# Patient Record
Sex: FEMALE | Race: WHITE | NOT HISPANIC OR LATINO | Employment: PART TIME | URBAN - METROPOLITAN AREA
[De-identification: names, ages, dates, MRNs, and addresses within clinical notes are randomized per-mention and may not be internally consistent; named-entity substitution may affect disease eponyms.]

---

## 2020-04-08 ENCOUNTER — TELEPHONE (OUTPATIENT)
Dept: PULMONOLOGY | Facility: CLINIC | Age: 65
End: 2020-04-08

## 2020-04-14 ENCOUNTER — TELEPHONE (OUTPATIENT)
Dept: PULMONOLOGY | Facility: CLINIC | Age: 65
End: 2020-04-14

## 2020-04-14 NOTE — TELEPHONE ENCOUNTER
----- Message from Aisha Maharaj sent at 4/14/2020  3:28 PM CDT -----  Contact: pt  Would like to consult with nurse regarding her appt being moved to July, wants to know is doctor doing virtual visits. Please give a call back at [phone].          Thanks,  Aisha REVELES

## 2020-04-21 DIAGNOSIS — Z01.84 ANTIBODY RESPONSE EXAMINATION: ICD-10-CM

## 2020-05-21 DIAGNOSIS — Z01.84 ANTIBODY RESPONSE EXAMINATION: ICD-10-CM

## 2020-06-12 ENCOUNTER — LAB VISIT (OUTPATIENT)
Dept: LAB | Facility: HOSPITAL | Age: 65
End: 2020-06-12
Attending: INTERNAL MEDICINE
Payer: MEDICARE

## 2020-06-12 DIAGNOSIS — Z01.84 ANTIBODY RESPONSE EXAMINATION: ICD-10-CM

## 2020-06-12 LAB — SARS-COV-2 IGG SERPLBLD QL IA.RAPID: NEGATIVE

## 2020-06-12 PROCEDURE — 36415 COLL VENOUS BLD VENIPUNCTURE: CPT

## 2020-06-12 PROCEDURE — 86769 SARS-COV-2 COVID-19 ANTIBODY: CPT

## 2020-07-20 ENCOUNTER — TELEPHONE (OUTPATIENT)
Dept: PULMONOLOGY | Facility: CLINIC | Age: 65
End: 2020-07-20

## 2020-07-20 NOTE — TELEPHONE ENCOUNTER
----- Message from Pablito Rebolledo sent at 7/20/2020  9:36 AM CDT -----  Regarding: Appointment  Contact: Pt  Pt called in regards to seeing if  has any later times on 07/21/20 or 07/22/20. Pt can be reached at [phone].

## 2020-07-21 ENCOUNTER — OFFICE VISIT (OUTPATIENT)
Dept: PULMONOLOGY | Facility: CLINIC | Age: 65
End: 2020-07-21
Payer: MEDICARE

## 2020-07-21 VITALS
BODY MASS INDEX: 19.38 KG/M2 | HEART RATE: 68 BPM | HEIGHT: 65 IN | DIASTOLIC BLOOD PRESSURE: 76 MMHG | RESPIRATION RATE: 18 BRPM | OXYGEN SATURATION: 97 % | WEIGHT: 116.31 LBS | SYSTOLIC BLOOD PRESSURE: 112 MMHG

## 2020-07-21 DIAGNOSIS — F51.04 PSYCHOPHYSIOLOGICAL INSOMNIA: ICD-10-CM

## 2020-07-21 DIAGNOSIS — G47.33 OSA (OBSTRUCTIVE SLEEP APNEA): Primary | ICD-10-CM

## 2020-07-21 PROBLEM — I44.2 THIRD DEGREE ATRIOVENTRICULAR BLOCK: Status: ACTIVE | Noted: 2020-02-26

## 2020-07-21 PROBLEM — I44.1 SECOND DEGREE AV BLOCK: Status: ACTIVE | Noted: 2020-02-26

## 2020-07-21 PROBLEM — R06.02 SHORTNESS OF BREATH: Status: ACTIVE | Noted: 2020-02-26

## 2020-07-21 PROBLEM — Z95.0 CARDIAC PACEMAKER IN SITU: Status: ACTIVE | Noted: 2020-03-04

## 2020-07-21 PROCEDURE — 99204 PR OFFICE/OUTPT VISIT, NEW, LEVL IV, 45-59 MIN: ICD-10-PCS | Mod: S$PBB,,, | Performed by: INTERNAL MEDICINE

## 2020-07-21 PROCEDURE — 99999 PR PBB SHADOW E&M-EST. PATIENT-LVL III: CPT | Mod: PBBFAC,,, | Performed by: INTERNAL MEDICINE

## 2020-07-21 PROCEDURE — 99204 OFFICE O/P NEW MOD 45 MIN: CPT | Mod: S$PBB,,, | Performed by: INTERNAL MEDICINE

## 2020-07-21 PROCEDURE — 99213 OFFICE O/P EST LOW 20 MIN: CPT | Mod: PBBFAC | Performed by: INTERNAL MEDICINE

## 2020-07-21 PROCEDURE — 99999 PR PBB SHADOW E&M-EST. PATIENT-LVL III: ICD-10-PCS | Mod: PBBFAC,,, | Performed by: INTERNAL MEDICINE

## 2020-07-21 RX ORDER — IBUPROFEN 200 MG
200 TABLET ORAL EVERY 6 HOURS PRN
COMMUNITY

## 2020-07-21 RX ORDER — DIAZEPAM 2 MG/1
TABLET ORAL
COMMUNITY
Start: 2020-06-02 | End: 2020-12-15

## 2020-07-21 RX ORDER — HYDROXYZINE PAMOATE 25 MG/1
25 CAPSULE ORAL NIGHTLY PRN
Qty: 30 CAPSULE | Refills: 11 | Status: SHIPPED | OUTPATIENT
Start: 2020-07-21 | End: 2020-12-15

## 2020-07-21 NOTE — PATIENT INSTRUCTIONS
[facility] to schedule sleep study at  [phone] . Usually take 1- 2 days to get insurance company approval.  You will need to schedule at follow up clinic appointment 7 days after the sleep study to review the results.

## 2020-07-21 NOTE — PROGRESS NOTES
Subjective:     Patient ID: Viola Collins is a 64 y.o. female.    Chief Complaint:      HPI initial office visit for this 64-year-old female patient with insomnia and suspected obstructive sleep apnea.    Sleep Apnea  She presents for a sleep evaluation. She complains of snoring, periods of not breathing, decreased memory, decreased concentration, sinus problems, congested nose, feels sleepy during the day, take naps during the day.  Symptoms began all her life years ago, unchanged since that time.  She goes to sleep at 11- 12 weekdays and  weekends. She awakens 6:30- 7 weekdays and  weekends. She falls asleep in 60 min utes minutes.  Collar size na. She denies knees buckling with laughing, completely or partially paralyzed while falling asleep or waking up. Previous evaluation and treatment has included none.      Insomnia:  complains of insomnia. Onset was many years - as senior in high school years ago. Patient describes symptoms as early morning awakening and difficulty falling asleep. Patient has found minimal relief with over the counter diphenhydramine, melatonin use and prescription sleep aid, valium, ambien, ativan. Associated symptoms include: anxiety and leg cramps. Patient denies depression. Symptoms have gradually worsened.  Seen by Dr. Mondragon to help with sleep  She was prescribed Sinequan that made her feel drugged out the following day.  She has been previously prescribed Valium and Ambien and has had intermittent success with these medications.    Treatments tried in the past:   Hx of recent cardiology concerns    No past medical history on file.  Past Surgical History:   Procedure Laterality Date    INSERT / REPLACE / REMOVE PACEMAKER      INSERTION OF PACEMAKER  02/28/2020     Review of patient's allergies indicates:  No Known Allergies  Current Outpatient Medications on File Prior to Visit   Medication Sig Dispense Refill    diazePAM (VALIUM) 2 MG tablet       ibuprofen (ADVIL,MOTRIN) 200 MG  tablet Take 200 mg by mouth every 6 (six) hours as needed.       No current facility-administered medications on file prior to visit.      Social History     Socioeconomic History    Marital status:      Spouse name: Not on file    Number of children: Not on file    Years of education: Not on file    Highest education level: Not on file   Occupational History    Not on file   Social Needs    Financial resource strain: Not on file    Food insecurity     Worry: Not on file     Inability: Not on file    Transportation needs     Medical: Not on file     Non-medical: Not on file   Tobacco Use    Smoking status: Never Smoker    Smokeless tobacco: Never Used   Substance and Sexual Activity    Alcohol use: Yes     Comment: Social drinker     Drug use: Never    Sexual activity: Not Currently   Lifestyle    Physical activity     Days per week: Not on file     Minutes per session: Not on file    Stress: Not on file   Relationships    Social connections     Talks on phone: Not on file     Gets together: Not on file     Attends Latter day service: Not on file     Active member of club or organization: Not on file     Attends meetings of clubs or organizations: Not on file     Relationship status: Not on file   Other Topics Concern    Not on file   Social History Narrative    Not on file     Family History   Problem Relation Age of Onset    Cancer Father     Emphysema Sister        Review of Systems   Constitutional: Negative for fever and fatigue.   HENT: Negative for postnasal drip and rhinorrhea.    Eyes: Negative for redness and itching.   Respiratory: Positive for apnea. Negative for cough, shortness of breath, wheezing, dyspnea on extertion and Paroxysmal Nocturnal Dyspnea.    Cardiovascular: Negative for chest pain.   Genitourinary: Negative for difficulty urinating and hematuria.   Endocrine: Negative for polyphagia, cold intolerance and heat intolerance.    Musculoskeletal: Negative for  "arthralgias.   Skin: Negative for rash.   Gastrointestinal: Negative for nausea, vomiting, abdominal pain and abdominal distention.   Neurological: Negative for dizziness and headaches.   Hematological: Negative for adenopathy. Does not bruise/bleed easily and no excessive bruising.   Psychiatric/Behavioral: Positive for sleep disturbance. The patient is not nervous/anxious.        Objective:      /76   Pulse 68   Resp 18   Ht 5' 5" (1.651 m)   Wt 52.7 kg (116 lb 4.7 oz)   SpO2 97%   BMI 19.35 kg/m²   Physical Exam  Vitals signs and nursing note reviewed.   Constitutional:       Appearance: She is well-developed.   HENT:      Head: Normocephalic and atraumatic.   Eyes:      Conjunctiva/sclera: Conjunctivae normal.      Pupils: Pupils are equal, round, and reactive to light.   Neck:      Musculoskeletal: Neck supple.      Thyroid: No thyromegaly.      Vascular: No JVD.      Trachea: No tracheal deviation.   Cardiovascular:      Rate and Rhythm: Normal rate and regular rhythm.      Heart sounds: Normal heart sounds.   Pulmonary:      Effort: Pulmonary effort is normal. No respiratory distress.      Breath sounds: Normal breath sounds. No wheezing or rales.   Chest:      Chest wall: No tenderness.   Abdominal:      General: Bowel sounds are normal.      Palpations: Abdomen is soft.   Musculoskeletal: Normal range of motion.   Lymphadenopathy:      Cervical: No cervical adenopathy.   Skin:     General: Skin is warm and dry.   Neurological:      Mental Status: She is alert and oriented to person, place, and time.       Personal Diagnostic Review  none pertinent  No image results found.      Office Spirometry Results:     No flowsheet data found.  Pulmonary Studies Review 7/21/2020   SpO2 97   Height 65   Weight 1860.68   BMI (Calculated) 19.4   Predicted Distance 377.99   Predicted Distance Meters (Calculated) 518.86         Assessment:            DORIS (obstructive sleep apnea)  -     Home Sleep Studies; " Future; Expected date: 07/21/2020    Psychophysiological insomnia  -     hydrOXYzine pamoate (VISTARIL) 25 MG Cap; Take 1 capsule (25 mg total) by mouth nightly as needed (insomnia).  Dispense: 30 capsule; Refill: 11          Outpatient Encounter Medications as of 7/21/2020   Medication Sig Dispense Refill    diazePAM (VALIUM) 2 MG tablet       ibuprofen (ADVIL,MOTRIN) 200 MG tablet Take 200 mg by mouth every 6 (six) hours as needed.      hydrOXYzine pamoate (VISTARIL) 25 MG Cap Take 1 capsule (25 mg total) by mouth nightly as needed (insomnia). 30 capsule 11     No facility-administered encounter medications on file as of 7/21/2020.      Plan:       Requested Prescriptions     Signed Prescriptions Disp Refills    hydrOXYzine pamoate (VISTARIL) 25 MG Cap 30 capsule 11     Sig: Take 1 capsule (25 mg total) by mouth nightly as needed (insomnia).     Problem List Items Addressed This Visit     Psychophysiological insomnia    Relevant Medications    hydrOXYzine pamoate (VISTARIL) 25 MG Cap      Other Visit Diagnoses     DORIS (obstructive sleep apnea)    -  Primary    Relevant Orders    Home Sleep Studies             Follow up in about 5 weeks (around 8/25/2020) for Review Sleep Study - on return.    MEDICAL DECISION MAKING: Moderate to high complexity.  Overall, the multiple problems listed are of moderate to high severity that may impact quality of life and activities of daily living. Side effects of medications, treatment plan as well as options and alternatives reviewed and discussed with patient. There was counseling of patient concerning these issues.    Total time spent in face to face counseling and coordination of care - 45  minutes over 50% of time was used in discussion of prognosis, risks, benefits of treatment, instructions and compliance with regimen . Discussion with other physicians or health care providers (DME, NP, pharmacy, respiratory therapy) occurred.

## 2020-07-21 NOTE — LETTER
July 21, 2020      JERRY Nicholson           [facility]  [address] [place] [id number]  Phone: [phone]  Fax: [phone]          Patient: Viola Collins   MR Number: [id number]   YOB: 1955   Date of Visit: 7/21/2020           Thank you for referring Viola Collins to me for evaluation. Attached you will find relevant portions of my assessment and plan of care.    If you have questions, please do not hesitate to call me. I look forward to following Viola Collins along with you.    Sincerely,    Wolf Cam MD    Enclosure  CC:  Nicko Graves MD    IIf you would like to receive this communication electronically, please contact [email or web address] or [phone] to request IfOnly Link access.    IfOnly Link is a tool which provides read-only access to select patient information with whom you have a relationship. Its easy to use and provides real time access to review your patients record including encounter summaries, notes, results, and demographic information.    If you feel you have received this communication in error or would no longer like to receive these types of communications, please e-mail [email or web address]

## 2020-07-22 ENCOUNTER — TELEPHONE (OUTPATIENT)
Dept: PULMONOLOGY | Facility: CLINIC | Age: 65
End: 2020-07-22

## 2020-07-22 NOTE — TELEPHONE ENCOUNTER
----- Message from Pablito Rebolledo sent at 7/22/2020 11:32 AM CDT -----  Regarding: Appointment  Contact: Pt  Pt called in regards to scheduling appointment.Pt stated that she was told to schedule around 08/25/20. The first appointment is 09/09/2020. Pt can be reached at [phone] (tsge)

## 2020-07-23 ENCOUNTER — NURSE TRIAGE (OUTPATIENT)
Dept: ADMINISTRATIVE | Facility: CLINIC | Age: 65
End: 2020-07-23

## 2020-07-23 NOTE — TELEPHONE ENCOUNTER
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  And what is she supposed to do until her test comes back, can she return to work and still go into the community using precautions.  I advised that part of our protocol is that she communicate with our HR dept and her direct supervisor to alert them of her exposure and testing and they will tell her how to proceed.  She verbalizes understanding.   Reason for Disposition   [1] COVID-19 EXPOSURE (Close Contact) within last 14 days AND [2] needs COVID-19 lab test to return to work AND [3] NO symptoms    Additional Information   Negative: COVID-19 has been diagnosed by a healthcare provider (HCP)   Negative: COVID-19 lab test positive   Negative: [1] Symptoms of COVID-19 (e.g., cough, fever, SOB, or others) AND [2] lives in an area with community spread   Negative: [1] Symptoms of COVID-19 (e.g., cough, fever, SOB, or others) AND [2] within 14 days of EXPOSURE (close contact) with diagnosed or suspected COVID-19 patient   Negative: [1] Symptoms of COVID-19 (e.g., cough, fever, SOB, or others) AND [2] within 14 days of travel from high-risk area for COVID-19 community spread (identified by CDC)   Negative: [1] Difficulty breathing (shortness of breath) occurs AND [2] onset > 14 days after COVID-19 EXPOSURE (Close Contact) AND [3] no community spread   Negative: [1] Cough occurs AND [2] onset > 14 days after COVID-19 EXPOSURE AND [3] no community spread   Negative: [1] Common cold symptoms AND [2] onset > 14 days after COVID-19 EXPOSURE AND [3] no community spread    Protocols used: CORONAVIRUS (COVID-19) EXPOSURE-A-[place]     Vaccine Information Sheet (VIS) provided-VIS date: 8/07/15

## 2020-07-24 ENCOUNTER — TELEPHONE (OUTPATIENT)
Dept: PULMONOLOGY | Facility: CLINIC | Age: 65
End: 2020-07-24

## 2020-07-24 NOTE — TELEPHONE ENCOUNTER
----- Message from Pamela Basilio sent at 7/24/2020  9:20 AM CDT -----  Please call pt @ [phone] regarding appt on 8/26, pt have some questions

## 2020-08-19 ENCOUNTER — PROCEDURE VISIT (OUTPATIENT)
Dept: SLEEP MEDICINE | Facility: CLINIC | Age: 65
End: 2020-08-19
Payer: MEDICARE

## 2020-08-19 DIAGNOSIS — G47.33 OSA (OBSTRUCTIVE SLEEP APNEA): Primary | ICD-10-CM

## 2020-08-19 PROCEDURE — 95800 SLP STDY UNATTENDED: CPT | Mod: PBBFAC | Performed by: INTERNAL MEDICINE

## 2020-08-19 PROCEDURE — 95800 PR SLEEP STUDY, UNATTENDED, RECORD HEART RATE/O2 SAT/RESP ANAL/SLEEP TIME: ICD-10-PCS | Mod: 26,S$PBB,, | Performed by: INTERNAL MEDICINE

## 2020-08-19 PROCEDURE — 95800 SLP STDY UNATTENDED: CPT | Mod: 26,S$PBB,, | Performed by: INTERNAL MEDICINE

## 2020-08-19 NOTE — PROCEDURES
Home Sleep Studies    Date/Time: 8/19/2020 8:00 AM  Performed by: Ayden Bruce MD  Authorized by: Wolf Cam MD       PHYSICIAN INTERPRETATION AND COMMENTS: Findings are consistent with mild, positional obstructive sleep apnea  (DORIS). AHI was 13.0/hr Night #2. SpO2 merry 86.9%. Therapy indicated. CPAP titration or AutoPAP 5-20 cmwp with mask of  choice,  CLINICAL HISTORY: 64 year old female presented with: 12.3 inch neck, BMI of 20, an [place] sleepiness score of 5, history  of heart disease and symptoms of nocturnal snoring and witnessed apneas. Based on the clinical history, the patient has a low pretest  probability of having mild DORIS. Chronic Insomnia.

## 2020-08-19 NOTE — Clinical Note
PHYSICIAN INTERPRETATION AND COMMENTS: Findings are consistent with mild, positional obstructive sleep apnea  (DORIS). AHI was 13.0/hr Night #2. SpO2 merry 86.9%. Therapy indicated. CPAP titration or AutoPAP 5-20 cmwp with mask of  choice,  CLINICAL HISTORY: 64 year old female presented with: 12.3 inch neck, BMI of 20, an [place] sleepiness score of 5, history  of heart disease and symptoms of nocturnal snoring and witnessed apneas. Based on the clinical history, the patient has a low pretest  probability of having mild DORIS. Chronic Insomnia.

## 2020-08-25 ENCOUNTER — PATIENT MESSAGE (OUTPATIENT)
Dept: PULMONOLOGY | Facility: CLINIC | Age: 65
End: 2020-08-25

## 2020-08-25 ENCOUNTER — OFFICE VISIT (OUTPATIENT)
Dept: PULMONOLOGY | Facility: CLINIC | Age: 65
End: 2020-08-25
Payer: MEDICARE

## 2020-08-25 VITALS
WEIGHT: 119.5 LBS | OXYGEN SATURATION: 98 % | SYSTOLIC BLOOD PRESSURE: 112 MMHG | BODY MASS INDEX: 19.91 KG/M2 | RESPIRATION RATE: 17 BRPM | HEART RATE: 66 BPM | DIASTOLIC BLOOD PRESSURE: 76 MMHG | HEIGHT: 65 IN

## 2020-08-25 DIAGNOSIS — G47.33 OBSTRUCTIVE SLEEP APNEA: Primary | ICD-10-CM

## 2020-08-25 DIAGNOSIS — Z95.0 CARDIAC PACEMAKER IN SITU: ICD-10-CM

## 2020-08-25 DIAGNOSIS — F51.04 PSYCHOPHYSIOLOGICAL INSOMNIA: ICD-10-CM

## 2020-08-25 DIAGNOSIS — I44.2 THIRD DEGREE ATRIOVENTRICULAR BLOCK: ICD-10-CM

## 2020-08-25 DIAGNOSIS — R06.02 SHORTNESS OF BREATH: ICD-10-CM

## 2020-08-25 PROCEDURE — 99213 OFFICE O/P EST LOW 20 MIN: CPT | Mod: PBBFAC | Performed by: NURSE PRACTITIONER

## 2020-08-25 PROCEDURE — 99214 PR OFFICE/OUTPT VISIT, EST, LEVL IV, 30-39 MIN: ICD-10-PCS | Mod: S$PBB,,, | Performed by: NURSE PRACTITIONER

## 2020-08-25 PROCEDURE — 99214 OFFICE O/P EST MOD 30 MIN: CPT | Mod: S$PBB,,, | Performed by: NURSE PRACTITIONER

## 2020-08-25 PROCEDURE — 99999 PR PBB SHADOW E&M-EST. PATIENT-LVL III: CPT | Mod: PBBFAC,,, | Performed by: NURSE PRACTITIONER

## 2020-08-25 PROCEDURE — 99999 PR PBB SHADOW E&M-EST. PATIENT-LVL III: ICD-10-PCS | Mod: PBBFAC,,, | Performed by: NURSE PRACTITIONER

## 2020-08-25 NOTE — PATIENT INSTRUCTIONS
Continuous Positive Air Pressure (CPAP)     A mask over the nose gently directs air into the throat to keep the airway open.   Continuous positive air pressure (CPAP) uses gentle air pressure to hold the airway open. CPAP is often the most effective treatment for sleep apnea and severe snoring. It works very well for many people. But keep in mind that it can take several adjustments before the setup is right for you.  How CPAP works  The CPAP machine  is a small portable pump beside the bed. The pump sends air through a hose, which is held over your nose and mouth by a mask. Mild air pressure is gently pushed through your airway. The air pressure nudges sagging tissues aside. This widens the airway so you can breathe better. CPAP may be combined with other kinds of therapy for sleep apnea.  Types of air pressure treatments  There are different types of CPAP. Your doctor or CPAP technician will help you decide which type is best for you:  · Basic CPAP keeps the pressure constant all night long.  · A bilevel device (BiPAP) provides more pressure when you breathe in and less when you breathe out. A BiPAP machine also may be set to provide automatic breaths to maintain breathing if you stop breathing while sleeping.  · An autoCPAP device automatically adjusts pressure throughout the night and in response to changes such as body position, sleep stage, and snoring.  Date Last Reviewed: 8/10/2015  © 6023-7454 [employer]. [address] [place] [id number]. All rights reserved. This information is not intended as a substitute for professional medical care. Always follow your healthcare professional's instructions.        What Are Snoring and Obstructive Sleep Apnea?  If youve ever had a stuffed-up nose, you know the feeling of trying to breathe through a very narrow passageway. This is what happens in your throat when you snore. While you sleep, structures in your throat partially block your air  passage, making the passage narrow and hard to breathe through. If the entire passage becomes blocked and you cant breathe at all, you have sleep apnea.      Snoring Obstructive sleep apnea   Snoring  If your throat structures are too large or the muscles relax too much during sleep, the air passage may be partially blocked. As air from the nose or mouth passes around this blockage, the throat structures vibrate, causing the familiar sound of snoring. At times, this sound can be so loud that snorers wake up others, or even themselves, during the night. Snoring gets worse as more and more of the air passage is blocked.  Obstructive sleep apnea  If the structures completely block the throat, air cant flow to the lungs at all. This is called apnea (meaning no breathing). Since the lungs arent getting fresh air, the brain tells the body to wake up just enough to tighten the muscles and unblock the air passage. With a loud gasp, breathing begins again. This process may be repeated over and over again throughout the night, making your sleep fragmented with a lighter stage of sleep. Even though you do not remember waking up many times during the night to a lighter sleep, you feel tired the next day. The lack of sleep and fresh air can also strain your lungs, heart, and other organs, leading to problems such as high blood pressure, heart attack, or stroke.  Problems in the nose and jaw  Problems in the structure of the nose may obstruct breathing. A crooked (deviated) septum or swollen turbinates can make snoring worse or lead to apnea. Also, a receding jaw may make the tongue sit too far back, so its more likely to block the airway when youre asleep.        Date Last Reviewed: 7/18/2015  © 8404-7727 [employer]. [address] [place] [address], [place] [address]. All rights reserved. This information is not intended as a substitute for professional medical care. Always follow your healthcare professional's  instructions.      Improving sleep hygiene   Advise to Sleep as long as necessary to feel rested (usually seven to eight hours for adults) and then get out of bed  Maintain a regular sleep schedule, particularly a regular wake-up time in the morning  Try not to force sleep  Avoid caffeinated beverages after lunch, alcohol near bedtime , smoking or other nicotine intake, particularly during the evening  Adjust the bedroom environment as needed to decrease stimuli (reduce ambient light, turn off the television or radio)  Avoid prolonged use of light-emitting screens (laptops, tablets, smartphones, Turpitudeooks) before bedtime   Resolve concerns or worries before bedtime  Exercise regularly for at least 20-30 minutes, preferably more than four to five hours prior to bedtime   Avoid daytime naps, especially if they are longer than 20 to 30 minutes or occur late in the day

## 2020-08-25 NOTE — PROGRESS NOTES
Subjective:      Patient ID: Viola Collins is a 64 y.o. female.    Chief Complaint: Sleep Apnea    HPI: Viola Collins presents to clinic for follow up for DORIS with Home Sleep Study 8/17/2020, 8/18/2020    AHI was 13.0/hr Night #2. SpO2 merry 86.9%.  Orders for auto CPAP 5-20 cm with patient requested nasal mask.  [place] 2    Previous Report Reviewed: lab reports and office notes     Past Medical History: The following portions of the patient's history were reviewed and updated as appropriate:   She  has a past surgical history that includes Insert / replace / remove pacemaker and Insertion of pacemaker (02/28/2020).  Her family history includes Cancer in her father; Emphysema in her sister.  She  reports that she has never smoked. She has never used smokeless tobacco. She reports current alcohol use. She reports that she does not use drugs.  She has a current medication list which includes the following prescription(s): diazepam, ibuprofen, and hydroxyzine pamoate.  She has No Known Allergies..    The following portions of the patient's history were reviewed and updated as appropriate: allergies, current medications, past family history, past medical history, past social history, past surgical history and problem list.    Review of Systems   Constitutional: Negative for fever, chills, weight loss, weight gain, activity change, appetite change, fatigue and night sweats.   HENT: Negative for postnasal drip, rhinorrhea, sinus pressure, voice change and congestion.    Eyes: Negative for redness and itching.   Respiratory: Negative for snoring, cough, sputum production, chest tightness, shortness of breath, wheezing, orthopnea, asthma nighttime symptoms, dyspnea on extertion, use of rescue inhaler and somnolence.    Cardiovascular: Negative.  Negative for chest pain, palpitations and leg swelling.   Genitourinary: Negative for difficulty urinating and hematuria.   Endocrine: Negative for cold intolerance and heat  "intolerance.    Musculoskeletal: Negative for arthralgias, gait problem, joint swelling and myalgias.   Skin: Negative.    Gastrointestinal: Negative for nausea, vomiting, abdominal pain and acid reflux.   Neurological: Negative for dizziness, weakness, light-headedness and headaches.   Hematological: Negative for adenopathy. No excessive bruising.      Objective:   /76   Pulse 66   Resp 17   Ht 5' 5" (1.651 m)   Wt 54.2 kg (119 lb 7.8 oz)   SpO2 98%   BMI 19.88 kg/m²   Physical Exam  Vitals signs and nursing note reviewed.   Constitutional:       General: She is awake. She is not in acute distress.     Appearance: Normal appearance. She is well-developed, well-groomed and normal weight. She is not ill-appearing or toxic-appearing.   HENT:      Head: Normocephalic.      Right Ear: External ear normal.      Left Ear: External ear normal.      Nose: Nose normal.      Mouth/Throat:      Pharynx: No oropharyngeal exudate.   Eyes:      Conjunctiva/sclera: Conjunctivae normal.   Neck:      Musculoskeletal: Normal range of motion and neck supple.   Cardiovascular:      Rate and Rhythm: Normal rate and regular rhythm.      Heart sounds: Normal heart sounds.   Pulmonary:      Effort: Pulmonary effort is normal.      Breath sounds: Normal breath sounds. No stridor.   Abdominal:      Palpations: Abdomen is soft.   Musculoskeletal: Normal range of motion.   Lymphadenopathy:      Cervical: No cervical adenopathy.   Skin:     General: Skin is warm and dry.   Neurological:      Mental Status: She is alert and oriented to person, place, and time.   Psychiatric:         Behavior: Behavior normal. Behavior is cooperative.         Thought Content: Thought content normal.         Judgment: Judgment normal.       Personal Diagnostic Review  Home Sleep Study 8/17/2020, 8/18/2020   PHYSICIAN INTERPRETATION AND COMMENTS: Findings are consistent with mild, positional obstructive sleep apnea (DORIS). AHI was 13.0/hr Night #2. SpO2 " merry 86.9%. Therapy indicated. CPAP titration or AutoPAP 5-20 cmwp with mask of choice.    Assessment:     1. Obstructive sleep apnea    2. Third degree atrioventricular block    3. Shortness of breath    4. Cardiac pacemaker in situ    5. Psychophysiological insomnia        Orders Placed This Encounter   Procedures    CPAP FOR HOME USE     Home Sleep Study 8/17/2020, 8/18/2020 AHI was 13.0/hr Night #2. SpO2 merry 86.9%.     Order Specific Question:   Type:     Answer:   Auto CPAP     Order Specific Question:   Auto CPAP pressure setting range (cmH20):     Answer:   5-20 cm     Order Specific Question:   Length of need (1-99 months):     Answer:   99     Order Specific Question:   Humidification:     Answer:   Heated     Order Specific Question:   Type of mask:     Answer:   Nasal     Order Specific Question:   Headgear?     Answer:   Yes     Order Specific Question:   Tubing?     Answer:   Yes     Order Specific Question:   Humidifier chamber?     Answer:   Yes     Order Specific Question:   Chin strap?     Answer:   Yes     Order Specific Question:   Filters?     Answer:   Yes     Order Specific Question:   Cushions?     Answer:   Yes     Plan:     Problem List Items Addressed This Visit     Third degree atrioventricular block     Pacemaker 2/28/2020, Dr. Graves.            RESOLVED: Shortness of breath     Resolved after pacemaker placed, only on TM           Psychophysiological insomnia     On valium 2 mg, as needed. About 2-3 times followed by Dr. Kasey Ogden.   Tried Vistaril did not help, and felt terrible next day.  Tired Sinequan did not help and made tired next day.  8/17/2020, 8/18/2020 Home Sleep Study mild obstructive sleep apnea AHI 13.0.   8/25/2020 orders for auto CPAP 5-20 cm              Obstructive sleep apnea - Primary     Home Sleep Study 8/17/2020, 8/18/2020 AHI was 13.0/hr Night #2. SpO2 merry 86.9%.  Begin auto CPAP 5-20 cm with patient requested nasal mask.  [place] 2              Relevant Orders    CPAP FOR HOME USE    Cardiac pacemaker in situ     2/28/2020 placed by Dr. Graves              [place] [place] [facility]  Reviewed therapeutic goals for positive airway pressure therapy Auto CPAP  Ideal is usage 100% of nights for 6 - 8 hours per night. Minimum usage is 70% of night for at least 4 hours per night used.     Follow up for CPAP compliance download after initial set up.    TIME SPENT WITH PATIENT: Time spent:25 minutes in face to face  discussion concerning diagnosis, prognosis, review of lab and test results, benefits of treatment as well as management of disease, counseling of patient. Greater than half the time spent was used for coordination of care and counseling of patient.

## 2020-08-25 NOTE — ASSESSMENT & PLAN NOTE
On valium 2 mg, as needed. About 2-3 times followed by Dr. Kasey Ogden.   Tried Vistaril did not help, and felt terrible next day.  Tired Sinequan did not help and made tired next day.  8/17/2020, 8/18/2020 Home Sleep Study mild obstructive sleep apnea AHI 13.0.   8/25/2020 orders for auto CPAP 5-20 cm

## 2020-08-25 NOTE — ASSESSMENT & PLAN NOTE
Home Sleep Study 8/17/2020, 8/18/2020 AHI was 13.0/hr Night #2. SpO2 merry 86.9%.  Begin auto CPAP 5-20 cm with patient requested nasal mask.  [place] 2

## 2020-08-26 ENCOUNTER — TELEPHONE (OUTPATIENT)
Dept: PULMONOLOGY | Facility: CLINIC | Age: 65
End: 2020-08-26

## 2020-08-26 NOTE — TELEPHONE ENCOUNTER
----- Message from Christine Mccarthy sent at 8/24/2020 11:30 AM CDT -----  Regarding: call back  Contact: pt  Pt requesting a call back in regards to her appt    Please call and advise    Phone [phone]

## 2020-09-14 ENCOUNTER — TELEPHONE (OUTPATIENT)
Dept: PULMONOLOGY | Facility: CLINIC | Age: 65
End: 2020-09-14

## 2020-09-14 NOTE — TELEPHONE ENCOUNTER
----- Message from Triny Griffin sent at 9/14/2020 11:00 AM CDT -----  Regarding: request call harley  .Type:  Needs Medical Advice    Who Called:  pt  Symptoms (please be specific):  something for sleep    How long has patient had these symptoms:     Pharmacy name and phone #:         [id number] [place] [facility] [address]  [address]  [address]  [facility] [place] [address]  Phone: [phone] Fax: [phone]      Would the patient rather a call back or a response via [facility]?  Call back   Best Call Back Number:  [phone] (home)   Additional Information:

## 2020-09-15 ENCOUNTER — TELEPHONE (OUTPATIENT)
Dept: PULMONOLOGY | Facility: CLINIC | Age: 65
End: 2020-09-15

## 2020-09-15 DIAGNOSIS — F51.04 PSYCHOPHYSIOLOGICAL INSOMNIA: Primary | ICD-10-CM

## 2020-09-15 DIAGNOSIS — G47.33 OBSTRUCTIVE SLEEP APNEA: ICD-10-CM

## 2020-09-15 RX ORDER — TRAZODONE HYDROCHLORIDE 50 MG/1
50 TABLET ORAL NIGHTLY PRN
Qty: 30 TABLET | Refills: 11 | Status: SHIPPED | OUTPATIENT
Start: 2020-09-15 | End: 2020-12-15

## 2020-09-15 NOTE — TELEPHONE ENCOUNTER
Returned call - problems with insomnia  Tried vistaril - not helpful  Trial of trazadone for insomnia - [facility]

## 2020-09-15 NOTE — TELEPHONE ENCOUNTER
Spoke to pt.  She is asking for something different to help her sleep.  She states Vistaril and Valium are not helping.  She also is asking about your recommendations regarding mouthpiece vs. cpap for her DORIS.    Please advise.

## 2020-09-15 NOTE — TELEPHONE ENCOUNTER
----- Message from Marzena Wolff sent at 9/15/2020  8:09 AM CDT -----  Type:  Patient Returning Call    Who Called:Viola  Who Left Message for Patient:  Does the patient know what this is regarding?:yes  Would the patient rather a call back or a response via [facility]? call  Best Call Back Number:[phone]    Additional Information:

## 2020-09-21 ENCOUNTER — TELEPHONE (OUTPATIENT)
Dept: PULMONOLOGY | Facility: CLINIC | Age: 65
End: 2020-09-21

## 2020-09-21 NOTE — TELEPHONE ENCOUNTER
----- Message from Francie Mohan sent at 9/21/2020  3:53 PM CDT -----  Regarding: Call back  Contact: Patient  Patient would like the nurse to give her a call back at Ph .[phone] (home), concerning if its safe for her to  take trazadone, since she has a pacemaker.

## 2020-09-22 ENCOUNTER — TELEPHONE (OUTPATIENT)
Dept: PULMONOLOGY | Facility: CLINIC | Age: 65
End: 2020-09-22

## 2020-09-22 NOTE — TELEPHONE ENCOUNTER
----- Message from Wolf Cam MD sent at 9/22/2020  3:58 PM CDT -----  Regarding: RE: Call back  Contact: Patient  Safe to use trazodone with pacemaker  ----- Message -----  From: Juan Lange LPN  Sent: 9/21/2020   4:05 PM CDT  To: Wolf Cam MD  Subject: FW: Call back                                      ----- Message -----  From: Francie Mohan  Sent: 9/21/2020   3:53 PM CDT  To: [facility]  Subject: Call back                                        Patient would like the nurse to give her a call back at Ph .[phone] (home), concerning if its safe for her to  take trazadone, since she has a pacemaker.

## 2020-09-22 NOTE — TELEPHONE ENCOUNTER
Pt states that she was having palpitations with the trazadone and prefers not to take it.  Encouraged pt to contact her Cardiologist concerning her palpitations.

## 2020-10-02 ENCOUNTER — PATIENT MESSAGE (OUTPATIENT)
Dept: PULMONOLOGY | Facility: CLINIC | Age: 65
End: 2020-10-02

## 2020-10-16 ENCOUNTER — TELEPHONE (OUTPATIENT)
Dept: PULMONOLOGY | Facility: CLINIC | Age: 65
End: 2020-10-16

## 2020-10-16 NOTE — TELEPHONE ENCOUNTER
----- Message from Triny Griffin sent at 10/16/2020  4:00 PM CDT -----  Regarding: med  .Type:  Needs Medical Advice    Who Called: pt  Symptoms (please be specific): another medication for sleep  How long has patient had these symptoms:   n/a  Pharmacy name and phone #:      [id number] [place] [facility] [address]  [address]  [address]  [place] [address]  Phone: [phone] Fax: [phone]      Would the patient rather a call back or a response via [facility]? Call back  Best Call Back Number: [phone] (home)     Additional Information:

## 2020-12-09 ENCOUNTER — TELEPHONE (OUTPATIENT)
Dept: PULMONOLOGY | Facility: CLINIC | Age: 65
End: 2020-12-09

## 2020-12-09 DIAGNOSIS — G47.33 OSA ON CPAP: ICD-10-CM

## 2020-12-09 DIAGNOSIS — G47.33 OBSTRUCTIVE SLEEP APNEA: Primary | ICD-10-CM

## 2020-12-09 NOTE — LETTER
December 9, 2020    Viola KAE Collins  [address] [place] [address]       [facility]  [address] [place] [address]  Phone: [phone]  Fax: [phone] Dear Ms. Collins:    This is a referral letter for an oral appliance for treatment of Obstructive Sleep Apnea.  Enclosed is a copy of the most recent sleep study available as well my office note.  This imformatino has been faxed to Dr. San's office.    If you have any questions or concerns, please don't hesitate to call.    Sincerely,

## 2020-12-09 NOTE — TELEPHONE ENCOUNTER
Dr. San's office requesting a referral for oral appliance  for DORIS.    To be faxed to [phone]

## 2020-12-09 NOTE — TELEPHONE ENCOUNTER
----- Message from Fide Lyons sent at 12/9/2020 11:24 AM CST -----  Contact: Dr Mena Cox([facility])-[phone]  Would like to consult with nurse regarding patient Sleep Apnea. Please call back at [phone]. Thanks/ar

## 2020-12-10 NOTE — TELEPHONE ENCOUNTER
letter generated  Referral generated  Unable to print from this computer  [facility]

## 2020-12-14 ENCOUNTER — TELEPHONE (OUTPATIENT)
Dept: PULMONOLOGY | Facility: CLINIC | Age: 65
End: 2020-12-14

## 2020-12-14 NOTE — TELEPHONE ENCOUNTER
Spoke to trace, insurance is needing an order  stating reason for referral with NCD code    oral appliance for sleep apnea  Fax [phone]    ----- Message from Marzena Wolff sent at 12/14/2020 11:56 AM CST -----   is calling to speak with Atiya in office about mutual pt. Please call back at [phone]

## 2020-12-14 NOTE — LETTER
December 14, 2020    Viola Collins  [address]  [place] [address]     [facility]  [address] [place] [address]  Phone: [phone]  Fax: [phone] Dear Ms. Collins:    This is a referral letter for an oral appliance for treatment of Obstructive Sleep Apnea. The CODE for the Oral Appliance is     Diagnosis Code: G47.33 for Obstructive Sleep Apnea   Enclosed is a copy of the most recent sleep study available as well my office note.  This information has been faxed to Dr. San's office.     If you have any questions or concerns, please don't hesitate to call.    Sincerely,      Wolf Cam MD  [id number]                                             Procedure visit    8/19/2020  [facility]   DORIS (obstructive sleep apnea)  Dx  Sleep Apnea ; Referred by Wolf Cam MD  Reason for Visit   Referring Provider    Wolf Cam MD          Additional Documentation    Encounter Info:    Billing Info,   History,   Detailed Report,   Education,   Care Plan,   Allergies,   Patient-Entered Questionnaires      Procedures  Ayden Bruce MD (Physician)   Pulmonary Disease  Procedure Orders   1. Home Sleep Studies [[id number]] ordered by Wolf Cam MD   Post-procedure Diagnoses   1. DORIS (obstructive sleep apnea) [G47.33]      Home Sleep Studies   Date/Time: 8/19/2020 8:00 AM  Performed by: Ayden Bruce MD  Authorized by: Wolf Cam MD        PHYSICIAN INTERPRETATION AND COMMENTS: Findings are consistent with mild, positional obstructive sleep apnea  (DORIS). AHI was 13.0/hr Night #2. SpO2 merry 86.9%. Therapy indicated. CPAP titration or AutoPAP 5-20 cmwp with mask of  choice,  CLINICAL HISTORY: 64 year old female presented with: 12.3 inch neck, BMI of 20, an [place] sleepiness score of 5, history  of heart disease and symptoms of nocturnal snoring and witnessed apneas. Based on the clinical history, the patient has a low  pretest  probability of having mild DORIS. Chronic Insomnia.      Instructions     After Visit Summary (Printed 8/19/2020)  Communications       Chart Routed to Donovan Hernandez   Sent by Ayden Bruce MD  [employer]    Scan on 8/28/2020 8:16 AM   All Charges for This Encounter    Code Description Service Date Service Provider Modifiers Qty   54900 HI SLEEP STUDY, UNATTENDED, RECORD HEART RATE/O2 SAT/RESP ANAL/SLEEP TIME 8/19/2020 Ayden Bruce MD 26, S$PBB 1   89561 HI SLEEP STUDY, UNATTENDED, RECORD HEART RATE/O2 SAT/RESP ANAL/SLEEP TIME 8/19/2020 Ayden Bruce MD PBBFAC

## 2020-12-15 ENCOUNTER — OFFICE VISIT (OUTPATIENT)
Dept: PULMONOLOGY | Facility: CLINIC | Age: 65
End: 2020-12-15
Payer: MEDICARE

## 2020-12-15 VITALS
WEIGHT: 125 LBS | BODY MASS INDEX: 20.83 KG/M2 | OXYGEN SATURATION: 98 % | HEIGHT: 65 IN | DIASTOLIC BLOOD PRESSURE: 72 MMHG | HEART RATE: 82 BPM | RESPIRATION RATE: 16 BRPM | SYSTOLIC BLOOD PRESSURE: 126 MMHG

## 2020-12-15 DIAGNOSIS — F51.04 PSYCHOPHYSIOLOGICAL INSOMNIA: ICD-10-CM

## 2020-12-15 DIAGNOSIS — G47.33 OSA ON CPAP: Chronic | ICD-10-CM

## 2020-12-15 DIAGNOSIS — I44.1 SECOND DEGREE AV BLOCK: ICD-10-CM

## 2020-12-15 PROCEDURE — 99214 OFFICE O/P EST MOD 30 MIN: CPT | Mod: S$PBB,,, | Performed by: NURSE PRACTITIONER

## 2020-12-15 PROCEDURE — 99214 PR OFFICE/OUTPT VISIT, EST, LEVL IV, 30-39 MIN: ICD-10-PCS | Mod: S$PBB,,, | Performed by: NURSE PRACTITIONER

## 2020-12-15 PROCEDURE — 99999 PR PBB SHADOW E&M-EST. PATIENT-LVL III: ICD-10-PCS | Mod: PBBFAC,,, | Performed by: NURSE PRACTITIONER

## 2020-12-15 PROCEDURE — 99999 PR PBB SHADOW E&M-EST. PATIENT-LVL III: CPT | Mod: PBBFAC,,, | Performed by: NURSE PRACTITIONER

## 2020-12-15 PROCEDURE — 99213 OFFICE O/P EST LOW 20 MIN: CPT | Mod: PBBFAC | Performed by: NURSE PRACTITIONER

## 2020-12-15 RX ORDER — LORAZEPAM 0.5 MG/1
TABLET ORAL
COMMUNITY
Start: 2020-10-13

## 2020-12-15 NOTE — PROGRESS NOTES
Subjective:      Patient ID: Viola Collins is a 65 y.o. female.    Chief Complaint: Sleep Apnea    HPI: Viola Collins presents to clinic for follow up for DORIS with initial CPAP complaince assessment.  She is on Auto CPAP of 5-20 cmH2O pressure which is optimally controlling sleep apnea with apneic index (AHI) 2.4 events an hour.   She is NOT compliant with CPAP use. Complaince download today reveals 20.0% of days with greater than 4 hours of device use. Having difficulty with use of CPAP and finding comfort with sleep.  Patient reports some benefit from CPAP use she awoken feeling more rested.   Patient reports complaint of still awakening off and on with insomnia, awakening at times with cpap off her face.  she is thinking about seeing sleep dentist, Dr. San in .   Nasal pillows mask is tolerated.    [place] 4    Previous Report Reviewed: lab reports and office notes     Past Medical History: The following portions of the patient's history were reviewed and updated as appropriate:   She  has a past surgical history that includes Insert / replace / remove pacemaker and Insertion of pacemaker (02/28/2020).  Her family history includes Cancer in her father; Emphysema in her sister.   She  reports that she has never smoked. She has never used smokeless tobacco. She reports current alcohol use. She reports that she does not use drugs.  She has a current medication list which includes the following prescription(s): ibuprofen and lorazepam.  She has No Known Allergies..    The following portions of the patient's history were reviewed and updated as appropriate: allergies, current medications, past family history, past medical history, past social history, past surgical history and problem list.    Review of Systems   Constitutional: Negative for fever, chills, weight loss, weight gain, activity change, appetite change, fatigue and night sweats.   HENT: Negative for postnasal drip, rhinorrhea, sinus pressure,  "voice change and congestion.    Eyes: Negative for redness and itching.   Respiratory: Negative for snoring, cough, sputum production, chest tightness, shortness of breath, wheezing, orthopnea, asthma nighttime symptoms, dyspnea on extertion, use of rescue inhaler and somnolence.    Cardiovascular: Negative.  Negative for chest pain, palpitations and leg swelling.   Genitourinary: Negative for difficulty urinating and hematuria.   Endocrine: Negative for cold intolerance and heat intolerance.    Musculoskeletal: Negative for arthralgias, gait problem, joint swelling and myalgias.   Skin: Negative.    Gastrointestinal: Negative for nausea, vomiting, abdominal pain and acid reflux.   Neurological: Negative for dizziness, weakness, light-headedness and headaches.   Hematological: Negative for adenopathy. No excessive bruising.   All other systems reviewed and are negative.     Objective:   /72   Pulse 82   Resp 16   Ht 5' 5" (1.651 m)   Wt 56.7 kg (125 lb)   SpO2 98%   BMI 20.80 kg/m²   Physical Exam  Vitals signs and nursing note reviewed.   Constitutional:       General: She is not in acute distress.     Appearance: She is well-developed. She is not ill-appearing or toxic-appearing.   HENT:      Head: Normocephalic.      Right Ear: External ear normal.      Left Ear: External ear normal.      Nose: Nose normal.      Mouth/Throat:      Pharynx: No oropharyngeal exudate.   Eyes:      Conjunctiva/sclera: Conjunctivae normal.   Neck:      Musculoskeletal: Normal range of motion and neck supple.   Cardiovascular:      Rate and Rhythm: Normal rate and regular rhythm.      Heart sounds: Normal heart sounds.   Pulmonary:      Effort: Pulmonary effort is normal.      Breath sounds: Normal breath sounds. No stridor.   Abdominal:      Palpations: Abdomen is soft.   Musculoskeletal: Normal range of motion.   Lymphadenopathy:      Cervical: No cervical adenopathy.   Skin:     General: Skin is warm and dry. "   Neurological:      Mental Status: She is alert and oriented to person, place, and time.   Psychiatric:         Behavior: Behavior normal. Behavior is cooperative.         Thought Content: Thought content normal.         Judgment: Judgment normal.         Personal Diagnostic Review  CPAP download  APAP 5-20  cm  Compliance Summary  11/6/2020 - 12/5/2020 (30 days)  Days with Device Usage 20 days  Days without Device Usage 10 days  Percent Days with Device Usage 66.7%  Cumulative Usage 2 days 3 hrs. 34 mins. 6 secs.  Maximum Usage (1 Day) 7 hrs. 29 mins. 36 secs.  Average Usage (All Days) 1 hrs. 43 mins. 8 secs.  Average Usage (Days Used) 2 hrs. 34 mins. 42 secs.  Minimum Usage (1 Day) 16 mins. 6 secs.  Percent of Days with Usage >= 4 Hours 20.0%  Percent of Days with Usage < 4 Hours 80.0%  Date Range  Average AHI 2.4  Auto-CPAP Summary  Auto-CPAP Mean Pressure 5.6 cmH2O  Auto-CPAP Peak Average Pressure 6.5 cmH2O  Average Device Pressure <= 90% of Time 6.7 cmH2O  Average Time in Large Leak Per Day 2 mins. 48 secs.    Assessment:     1. DORIS on CPAP    2. Psychophysiological insomnia    3. Second degree AV block      No orders of the defined types were placed in this encounter.    Plan:     Problem List Items Addressed This Visit     Second degree AV block     Followed by cardiology, Dr. Nicko Graves         Psychophysiological insomnia     On valium 2 mg, as needed. About 2-3 times followed by Dr. Kasey Ogden.   Tried Vistaril did not help, and felt terrible next day.  Tired Sinequan did not help and made tired next day.  8/17/2020, 8/18/2020 Home Sleep Study mild obstructive sleep apnea AHI 13.0.   8/25/2020 orders for auto CPAP 5-20 cm   9/22/2020 obtained CPAP   Some benefit from CPAP, not adherent  Improve adherence to CPAP   PMR (progressive muscle relaxation) twice daily              DORIS on CPAP (Chronic)     Benefits and compliant with Auto CPAP 5-20 cm   AHI 2.4  Nasal pillows mask  [facility]  [facility]  Contemplating going to see sleep dentist, Dr. San for sleep apnea dental appliance since having difficulty with CPAP use  Adherence                 (DME) - [facility]  Reviewed therapeutic goals for positive airway pressure therapy Auto CPAP  Ideal is usage 100% of nights for 6 - 8 hours per night. Minimum usage is 70% of night for at least 4 hours per night used.     Follow up in about 6 weeks (around 1/26/2021) for CPAP compliance download not compliant at initial.     TIME SPENT WITH PATIENT: Time spent:25 minutes in face to face  discussion concerning diagnosis, prognosis, review of lab and test results, benefits of treatment as well as management of disease, counseling of patient. Greater than half the time spent was used for coordination of care and counseling of patient.     Note reviewed  Agree with plans for oral appliance if Continuous Positive Airway Pressure is not effective

## 2020-12-15 NOTE — ASSESSMENT & PLAN NOTE
Benefits and compliant with Auto CPAP 5-20 cm   AHI 2.4  Nasal pillows mask  [facility]  Contemplating going to see sleep dentist, Dr. San for sleep apnea dental appliance since having difficulty with CPAP use  Adherence

## 2020-12-15 NOTE — ASSESSMENT & PLAN NOTE
On valium 2 mg, as needed. About 2-3 times followed by Dr. Kasey Ogden.   Tried Vistaril did not help, and felt terrible next day.  Tired Sinequan did not help and made tired next day.  8/17/2020, 8/18/2020 Home Sleep Study mild obstructive sleep apnea AHI 13.0.   8/25/2020 orders for auto CPAP 5-20 cm   9/22/2020 obtained CPAP   Some benefit from CPAP, not adherent  Improve adherence to CPAP   PMR (progressive muscle relaxation) twice daily

## 2020-12-15 NOTE — PATIENT INSTRUCTIONS
Adjusting to CPAP for some people is takes time to adjust. Hope the techniques below will help you to adapt to CPAP the main thing is to not give up, you will adapt eventually for some it takes time. Let us know if we can help.     This is not unusual or hard to understand:  Breathing with CPAP is different from ordinary breathing, and this difference is aversive to some.  The problem can be overcome, however, and the benefits CPAP provides are certainly worth the effort.  Below you will find a simple and gradual way to get used to CPAP before you try to use it all night, every night.  The essence of this procedure is to relax and let breathing with CPAP become a habit.  It may take about 2 weeks, for some it takes 3-6 months to adapt to CPAP use nightly.   Please consider the following methods to help with adapting to CPAP:  1.  Wear CPAP while awake and comfortably seated, during the late evening.  2.  Wear CPAP in bed while attempting sleep at night.  3.  If your discomfort is too great any time, discontinue an attempt again later the same night, for the same amount of time.  4.  We may need to alter pressure if necessary, the down today reveals current settings are optimal to control your sleep apnea.   5.  If you find that it is very easy to get uses CPAP, you may start using it every night when your comfortable enough to do so.  6.  IMPORTANT REMINDER:  If you have a cold her sinus congestion is okay to miss in either to of CPAP.  Consider using antihistamines or decongestants to clear up year sinus congestion prior to sleeping.    DAYS 1-3  Start CPAP while awake and comfortably seated during the late evening, after having prepare for bed.  You may do this while watching television, listening to music or reading.  Use for 1 hr, then take off CPAP and go directly to bed to sleep.    Days 4-6  Start CPAP when you go to bed and use for 1 hr, or until you fall asleep.  If your discomfort is too great any time,  discontinue attempt again later the same night, for the same designated amount of time (1 hr).    Day 7-9  Increased time with CPAP to 2 hrs at night.  If your discomfort is too great any time, discontinue attempt again later the same night for the same designated amount of time (2 hrs).    Days 10-12  Increase time with CPAP to 3 hrs a night if your discomfort is too great any time, discontinue attempt again later the same night, for the same designated amount of time (3 hr).    Days 13-15  Sleep the entire night with CPAP.    OPTIONAL:  You may need Progressive Muscle Relaxation (PMR) to help put she would ease when using CPAP; do PRM twice each day, once in the morning or afternoon, and once in the evening just before using CPAP.  You may do PRM prior to any attempted to you are comfortable with CPAP.

## 2021-01-05 ENCOUNTER — PATIENT MESSAGE (OUTPATIENT)
Dept: PULMONOLOGY | Facility: CLINIC | Age: 66
End: 2021-01-05

## 2021-01-05 ENCOUNTER — TELEPHONE (OUTPATIENT)
Dept: PULMONOLOGY | Facility: CLINIC | Age: 66
End: 2021-01-05

## 2021-01-25 ENCOUNTER — PATIENT MESSAGE (OUTPATIENT)
Dept: PULMONOLOGY | Facility: CLINIC | Age: 66
End: 2021-01-25

## 2021-01-26 ENCOUNTER — PATIENT MESSAGE (OUTPATIENT)
Dept: ADMINISTRATIVE | Facility: CLINIC | Age: 66
End: 2021-01-26

## 2021-02-04 ENCOUNTER — PATIENT MESSAGE (OUTPATIENT)
Dept: PULMONOLOGY | Facility: CLINIC | Age: 66
End: 2021-02-04

## 2021-04-22 ENCOUNTER — TELEPHONE (OUTPATIENT)
Dept: PULMONOLOGY | Facility: CLINIC | Age: 66
End: 2021-04-22

## 2021-07-23 DIAGNOSIS — G47.33 OSA (OBSTRUCTIVE SLEEP APNEA): Primary | ICD-10-CM

## 2021-07-26 DIAGNOSIS — G47.33 OSA (OBSTRUCTIVE SLEEP APNEA): Primary | ICD-10-CM

## 2024-04-03 ENCOUNTER — PATIENT MESSAGE (OUTPATIENT)
Dept: PULMONOLOGY | Facility: CLINIC | Age: 69
End: 2024-04-03
Payer: MEDICARE

## 2024-08-02 ENCOUNTER — TELEPHONE (OUTPATIENT)
Dept: PULMONOLOGY | Facility: CLINIC | Age: 69
End: 2024-08-02
Payer: MEDICARE

## 2024-08-02 NOTE — TELEPHONE ENCOUNTER
----- Message from Janell Parra sent at 8/2/2024 12:10 PM CDT -----  Type:  Appointment Request         Name of Caller:pt  When is the first available appointment?No access  Symptoms:sleep apnea c/u  Would the patient rather a call back or a response via [facility]? call  Best Call Back Number:[phone]

## 2024-08-23 ENCOUNTER — TELEPHONE (OUTPATIENT)
Dept: PULMONOLOGY | Facility: CLINIC | Age: 69
End: 2024-08-23
Payer: MEDICARE

## 2024-08-23 NOTE — TELEPHONE ENCOUNTER
----- Message from Kip Prabhakar sent at 8/23/2024  2:09 PM CDT -----  Regarding: appt  Contact: VIOLA KRAFT [[id number]]  Type:  Sooner Appointment Request    Caller is requesting a sooner appointment.      Name of Caller:  Viola    When is the first available appointment?  Dept book    Symptoms:  R/S NP    Would the patient rather a call back or a response via [facility]? Call    Best Call Back Number:  [phone] (home)     Additional Information:  Please call to advise. Please call to advise.